# Patient Record
Sex: FEMALE | Race: WHITE | Employment: FULL TIME | URBAN - NONMETROPOLITAN AREA
[De-identification: names, ages, dates, MRNs, and addresses within clinical notes are randomized per-mention and may not be internally consistent; named-entity substitution may affect disease eponyms.]

---

## 2022-11-28 ENCOUNTER — OFFICE VISIT (OUTPATIENT)
Dept: FAMILY MEDICINE CLINIC | Age: 32
End: 2022-11-28
Payer: COMMERCIAL

## 2022-11-28 VITALS
HEART RATE: 106 BPM | OXYGEN SATURATION: 99 % | DIASTOLIC BLOOD PRESSURE: 78 MMHG | RESPIRATION RATE: 16 BRPM | TEMPERATURE: 98.4 F | SYSTOLIC BLOOD PRESSURE: 124 MMHG

## 2022-11-28 DIAGNOSIS — J40 BRONCHITIS: Primary | ICD-10-CM

## 2022-11-28 PROCEDURE — 99213 OFFICE O/P EST LOW 20 MIN: CPT | Performed by: FAMILY MEDICINE

## 2022-11-28 RX ORDER — AZITHROMYCIN 250 MG/1
250 TABLET, FILM COATED ORAL SEE ADMIN INSTRUCTIONS
Qty: 6 TABLET | Refills: 0 | Status: SHIPPED | OUTPATIENT
Start: 2022-11-28 | End: 2022-12-03

## 2022-11-28 SDOH — ECONOMIC STABILITY: FOOD INSECURITY: WITHIN THE PAST 12 MONTHS, THE FOOD YOU BOUGHT JUST DIDN'T LAST AND YOU DIDN'T HAVE MONEY TO GET MORE.: NEVER TRUE

## 2022-11-28 SDOH — ECONOMIC STABILITY: FOOD INSECURITY: WITHIN THE PAST 12 MONTHS, YOU WORRIED THAT YOUR FOOD WOULD RUN OUT BEFORE YOU GOT MONEY TO BUY MORE.: NEVER TRUE

## 2022-11-28 ASSESSMENT — PATIENT HEALTH QUESTIONNAIRE - PHQ9
5. POOR APPETITE OR OVEREATING: 1
SUM OF ALL RESPONSES TO PHQ QUESTIONS 1-9: 9
SUM OF ALL RESPONSES TO PHQ QUESTIONS 1-9: 9
4. FEELING TIRED OR HAVING LITTLE ENERGY: 1
SUM OF ALL RESPONSES TO PHQ QUESTIONS 1-9: 9
9. THOUGHTS THAT YOU WOULD BE BETTER OFF DEAD, OR OF HURTING YOURSELF: 1
10. IF YOU CHECKED OFF ANY PROBLEMS, HOW DIFFICULT HAVE THESE PROBLEMS MADE IT FOR YOU TO DO YOUR WORK, TAKE CARE OF THINGS AT HOME, OR GET ALONG WITH OTHER PEOPLE: 1
6. FEELING BAD ABOUT YOURSELF - OR THAT YOU ARE A FAILURE OR HAVE LET YOURSELF OR YOUR FAMILY DOWN: 1
SUM OF ALL RESPONSES TO PHQ QUESTIONS 1-9: 8
8. MOVING OR SPEAKING SO SLOWLY THAT OTHER PEOPLE COULD HAVE NOTICED. OR THE OPPOSITE, BEING SO FIGETY OR RESTLESS THAT YOU HAVE BEEN MOVING AROUND A LOT MORE THAN USUAL: 1
SUM OF ALL RESPONSES TO PHQ9 QUESTIONS 1 & 2: 2
2. FEELING DOWN, DEPRESSED OR HOPELESS: 1
1. LITTLE INTEREST OR PLEASURE IN DOING THINGS: 1
7. TROUBLE CONCENTRATING ON THINGS, SUCH AS READING THE NEWSPAPER OR WATCHING TELEVISION: 1
3. TROUBLE FALLING OR STAYING ASLEEP: 1

## 2022-11-28 ASSESSMENT — COLUMBIA-SUICIDE SEVERITY RATING SCALE - C-SSRS
1. WITHIN THE PAST MONTH, HAVE YOU WISHED YOU WERE DEAD OR WISHED YOU COULD GO TO SLEEP AND NOT WAKE UP?: NO
2. HAVE YOU ACTUALLY HAD ANY THOUGHTS OF KILLING YOURSELF?: NO
6. HAVE YOU EVER DONE ANYTHING, STARTED TO DO ANYTHING, OR PREPARED TO DO ANYTHING TO END YOUR LIFE?: NO

## 2022-11-28 ASSESSMENT — SOCIAL DETERMINANTS OF HEALTH (SDOH): HOW HARD IS IT FOR YOU TO PAY FOR THE VERY BASICS LIKE FOOD, HOUSING, MEDICAL CARE, AND HEATING?: NOT HARD AT ALL

## 2022-11-28 NOTE — PROGRESS NOTES
HPI:  Patient comes in today for   Chief Complaint   Patient presents with    Cough     Cough started about a week ago and it not going away is also 9 weeks preg     . c/o cough the pat week no relief with OTC meds. Non smoker no h/o asthma. No fever or body aches. Is pregnant 9 weeks    REVIEW OF SYSTEMS:  Cardio: No chest pain, palpitations, RENEE, edema, PND  Pulmonary: Has cough, no hemoptysis, no SOB  GI: No nausea, vomiting, dysphagia, odynophagia, diarrhea,constipation  : No dysuria, hematuria, urgency, incontinence  History reviewed. No pertinent past medical history. No current outpatient medications on file. No current facility-administered medications for this visit. Allergies   Allergen Reactions    Pcn [Penicillins] Anaphylaxis       PHYSICAL EXAM:  VS:  /78 (Site: Left Upper Arm, Position: Sitting, Cuff Size: Medium Adult)   Pulse (!) 106   Temp 98.4 °F (36.9 °C)   Resp 16   LMP  (LMP Unknown)   SpO2 99%   General:  Alert and oriented, NAD  HEENT:  TMs, MAG, EOMI, Conjunctivae clear,Throat currently clear. NECK:  Supple without adenopathy or thyromegaly, no carotid bruits  LUNGS:  CTA all fields  HEART:  RRR without Murmur  ABDOMEN:  Soft and nontender without palpable abnormalities  EXTREMITIES:  No edema, no calf tenderness    ASSESSMENT/PLAN:     Diagnosis Orders   1. Bronchitis            No orders of the defined types were placed in this encounter. Requested Prescriptions      No prescriptions requested or ordered in this encounter   Zpak  Plently of fluids . OTC cough meds  F/u with OB/Gyn    No follow-ups on file.     Electronically signed by Cassandra Crowell MD

## 2023-01-15 ENCOUNTER — HOSPITAL ENCOUNTER (EMERGENCY)
Age: 33
Discharge: HOME | End: 2023-01-16
Attending: EMERGENCY MEDICINE
Payer: COMMERCIAL

## 2023-01-15 VITALS
OXYGEN SATURATION: 98 % | TEMPERATURE: 98 F | RESPIRATION RATE: 18 BRPM | SYSTOLIC BLOOD PRESSURE: 137 MMHG | HEART RATE: 96 BPM | DIASTOLIC BLOOD PRESSURE: 82 MMHG

## 2023-01-15 DIAGNOSIS — O21.9 NAUSEA AND VOMITING DURING PREGNANCY: Primary | ICD-10-CM

## 2023-01-15 PROCEDURE — 2580000003 HC RX 258: Performed by: EMERGENCY MEDICINE

## 2023-01-15 PROCEDURE — 96374 THER/PROPH/DIAG INJ IV PUSH: CPT

## 2023-01-15 PROCEDURE — 99284 EMERGENCY DEPT VISIT MOD MDM: CPT

## 2023-01-15 PROCEDURE — 6360000002 HC RX W HCPCS: Performed by: EMERGENCY MEDICINE

## 2023-01-15 RX ORDER — ONDANSETRON 4 MG/1
4 TABLET, ORALLY DISINTEGRATING ORAL 3 TIMES DAILY PRN
Qty: 21 TABLET | Refills: 0 | Status: SHIPPED | OUTPATIENT
Start: 2023-01-15

## 2023-01-15 RX ORDER — 0.9 % SODIUM CHLORIDE 0.9 %
1000 INTRAVENOUS SOLUTION INTRAVENOUS ONCE
Status: COMPLETED | OUTPATIENT
Start: 2023-01-15 | End: 2023-01-16

## 2023-01-15 RX ORDER — LANOLIN ALCOHOL/MO/W.PET/CERES
50 CREAM (GRAM) TOPICAL DAILY
COMMUNITY

## 2023-01-15 RX ORDER — ONDANSETRON 2 MG/ML
4 INJECTION INTRAMUSCULAR; INTRAVENOUS ONCE
Status: COMPLETED | OUTPATIENT
Start: 2023-01-15 | End: 2023-01-15

## 2023-01-15 RX ORDER — DOXYLAMINE SUCCINATE AND PYRIDOXINE HYDROCHLORIDE, DELAYED RELEASE TABLETS 10 MG/10 MG 10; 10 MG/1; MG/1
2 TABLET, DELAYED RELEASE ORAL
Qty: 60 TABLET | Refills: 0 | Status: SHIPPED | OUTPATIENT
Start: 2023-01-15

## 2023-01-15 RX ADMIN — SODIUM CHLORIDE 1000 ML: 9 INJECTION, SOLUTION INTRAVENOUS at 23:41

## 2023-01-15 RX ADMIN — ONDANSETRON 4 MG: 2 INJECTION INTRAMUSCULAR; INTRAVENOUS at 23:48

## 2023-01-15 ASSESSMENT — PAIN - FUNCTIONAL ASSESSMENT: PAIN_FUNCTIONAL_ASSESSMENT: NONE - DENIES PAIN

## 2023-01-16 LAB
BILIRUBIN URINE: NEGATIVE
COLOR: YELLOW
EPITHELIAL CELLS UA: ABNORMAL /HPF (ref 0–25)
GLUCOSE URINE: NEGATIVE
KETONES, URINE: ABNORMAL
LEUKOCYTE ESTERASE, URINE: ABNORMAL
MUCUS: ABNORMAL
NITRITE, URINE: NEGATIVE
PH UA: 7 (ref 5–9)
PROTEIN UA: NEGATIVE
RBC UA: ABNORMAL /HPF (ref 0–2)
SPECIFIC GRAVITY UA: 1.02 (ref 1.01–1.02)
TURBIDITY: CLEAR
URINE HGB: NEGATIVE
UROBILINOGEN, URINE: NORMAL
WBC UA: ABNORMAL /HPF (ref 0–5)

## 2023-01-16 PROCEDURE — 81001 URINALYSIS AUTO W/SCOPE: CPT

## 2023-01-16 PROCEDURE — 87086 URINE CULTURE/COLONY COUNT: CPT

## 2023-01-16 NOTE — DISCHARGE INSTRUCTIONS
Return to the ED for persistent vomiting, development of fever, abdominal pain, or any other concerns. Urine culture is pending. If this shows evidence of UTI you will be contacted.

## 2023-01-16 NOTE — ED PROVIDER NOTES
[address]      Pt Name: Siena Raza  MRN: [id number]  [place] 1990  Date of evaluation: 1/15/2023  Provider: Nash Glover MD    [address] [place] [address]       Chief Complaint   Patient presents with    Nausea     Sudden onset nausea and vomiting since 6pm this evening. Currently 16 weeks pregnant         HISTORY OF PRESENT ILLNESS      Siena Raza is a 33 y.o. female  at [address] [place] [address] who presents to the emergency department for evaluation of nausea and vomiting. States that she had sudden onset of nausea and vomiting around 6 PM today. Has had 2 episodes of emesis and continues to have some nausea. Denies any other symptoms. Specifically, denies any recent illness or fever. No chest pain, cough or shortness of breath. No abdominal pain. Denies any vaginal bleeding or discharge. No dysuria. She has no other complaints at this time. PAST MEDICAL HISTORY     None reported      CURRENT MEDICATIONS       Previous Medications    PRENATAL MV-MIN-FE FUM-FA-DHA (PRENATAL 1 PO)    Take by mouth    VITAMIN B-6 (PYRIDOXINE) 50 MG TABLET    Take 50 mg by mouth daily       ALLERGIES       Pcn [penicillins]      SOCIAL HISTORY       Social History     Tobacco Use    Smoking status: Never    Smokeless tobacco: Never   Substance Use Topics    Alcohol use: Not Currently         PHYSICAL EXAM       ED Triage Vitals [01/15/23 2319]   BP Temp Temp Source Heart Rate Resp SpO2 Height Weight   137/82 98 °F (36.7 °C) Tympanic 96 18 98 % -- --       Physical Exam  Vitals reviewed. Constitutional:       General: She is not in acute distress. Appearance: She is not ill-appearing or diaphoretic. HENT:      Head: Normocephalic and atraumatic. Nose: No rhinorrhea. Mouth/Throat:      Mouth: Mucous membranes are moist.      Pharynx: Oropharynx is clear. No oropharyngeal exudate or posterior oropharyngeal erythema. Eyes:      General: No scleral icterus.   Pulmonary: Effort: Pulmonary effort is normal.   Abdominal:      General: There is no distension. Palpations: Abdomen is soft. There is no mass. Tenderness: There is no abdominal tenderness. There is no rebound. Skin:     General: Skin is warm and dry. Coloration: Skin is not jaundiced or pale. Neurological:      Mental Status: She is alert. Psychiatric:         Mood and Affect: Mood normal.         Behavior: Behavior normal.       DIAGNOSTIC RESULTS       LABS:  Labs Reviewed   URINALYSIS WITH MICROSCOPIC - Abnormal; Notable for the following components:       Result Value    Ketones, Urine 4+ (*)     Leukocyte Esterase, Urine TRACE (*)     Mucus, UA 3+ (*)     All other components within normal limits   CULTURE, URINE       All other labs were within normal range or not returned as of this dictation. EMERGENCY DEPARTMENT COURSE and DIFFERENTIAL DIAGNOSIS/MDM:     Bedside transabdominal ultrasound performed by me demonstrates a single, viable IUP with observed fetal movement and a fetal heart rate of 156 bpm.    Patient is hemodynamically stable and well-appearing. Complaining of nausea and 2 episodes of emesis. No other complaints. Do not suspect the nausea is related to ACS or other cardiac issue. No acute intra-abdominal process. No  complaints. UA today demonstrates 4+ ketones. No bacteria noted on microscopy. Trace LE noted, nitrite negative. In the absence of urinary complaints do not feel that UTI is present, urine culture is pending. Patient feels better after IV fluids and Zofran. Will discharge with as needed Zofran as well as a prescription for diplegia's, as she reports the pyridoxine and doxylamine she has been taking at home has not been helping. FINAL IMPRESSION      1.  Nausea and vomiting during pregnancy          DISPOSITION/PLAN     DISPOSITION Decision To Discharge 01/16/2023 01:19:06 AM    DISCHARGE MEDICATIONS:  New Prescriptions    DOXYLAMINE-PYRIDOXINE (DICLEGIS) 10-10 MG TBEC    Take 2 tablets by mouth nightly May take an additional tablet in the morning if still symptomatic.     ONDANSETRON (ZOFRAN-ODT) 4 MG DISINTEGRATING TABLET    Take 1 tablet by mouth 3 times daily as needed for Nausea or Vomiting       (Please note that portions of this note were completed with a voice recognition program.  Efforts were made to edit the dictations but occasionally words are mis-transcribed.)    Jennifer Hopper MD (electronically signed)  Attending Emergency Physician            Jennifer Hopper MD  01/16/23 [id number]

## 2023-01-17 LAB
CULTURE: NORMAL
SPECIMEN DESCRIPTION: NORMAL